# Patient Record
Sex: MALE | Race: BLACK OR AFRICAN AMERICAN | NOT HISPANIC OR LATINO | URBAN - METROPOLITAN AREA
[De-identification: names, ages, dates, MRNs, and addresses within clinical notes are randomized per-mention and may not be internally consistent; named-entity substitution may affect disease eponyms.]

---

## 2022-11-11 ENCOUNTER — EMERGENCY (EMERGENCY)
Facility: HOSPITAL | Age: 47
LOS: 1 days | Discharge: HOME | End: 2022-11-11
Attending: EMERGENCY MEDICINE

## 2022-11-11 VITALS
RESPIRATION RATE: 16 BRPM | HEART RATE: 75 BPM | SYSTOLIC BLOOD PRESSURE: 149 MMHG | OXYGEN SATURATION: 98 % | WEIGHT: 250 LBS | HEIGHT: 65 IN | TEMPERATURE: 98 F | DIASTOLIC BLOOD PRESSURE: 91 MMHG

## 2022-11-11 DIAGNOSIS — T20.49XA: ICD-10-CM
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
## 2022-11-11 DIAGNOSIS — T20.47XA: ICD-10-CM
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
## 2022-11-11 PROCEDURE — 99284 EMERGENCY DEPT VISIT MOD MDM: CPT

## 2022-11-11 RX ORDER — IBUPROFEN 200 MG
600 TABLET ORAL ONCE
Refills: 0 | Status: COMPLETED | OUTPATIENT
Start: 2022-11-11 | End: 2022-11-11

## 2022-11-11 RX ORDER — OXYCODONE AND ACETAMINOPHEN 5; 325 MG/1; MG/1
1 TABLET ORAL ONCE
Refills: 0 | Status: DISCONTINUED | OUTPATIENT
Start: 2022-11-11 | End: 2022-11-11

## 2022-11-11 RX ORDER — IBUPROFEN 200 MG
1 TABLET ORAL
Qty: 15 | Refills: 0
Start: 2022-11-11 | End: 2022-11-15

## 2022-11-11 RX ORDER — CEPHALEXIN 500 MG
500 CAPSULE ORAL ONCE
Refills: 0 | Status: COMPLETED | OUTPATIENT
Start: 2022-11-11 | End: 2022-11-11

## 2022-11-11 RX ORDER — TRAMADOL HYDROCHLORIDE 50 MG/1
1 TABLET ORAL
Qty: 15 | Refills: 0
Start: 2022-11-11 | End: 2022-11-15

## 2022-11-11 RX ORDER — CEPHALEXIN 500 MG
1 CAPSULE ORAL
Qty: 28 | Refills: 0
Start: 2022-11-11 | End: 2022-11-17

## 2022-11-11 RX ADMIN — Medication 600 MILLIGRAM(S): at 12:16

## 2022-11-11 RX ADMIN — Medication 500 MILLIGRAM(S): at 12:16

## 2022-11-11 RX ADMIN — OXYCODONE AND ACETAMINOPHEN 1 TABLET(S): 5; 325 TABLET ORAL at 12:16

## 2022-11-11 NOTE — ED PROVIDER NOTE - PHYSICAL EXAMINATION
Gen: Alert, NAD  Head: NC, AT   Eyes: PERRL, EOMI, normal lids/conjunctiva  ENT: normal hearing, patent oropharynx without erythema/exudate, uvula midline  Neck: supple, no tenderness, Trachea midline  Pulm: Bilateral BS, normal resp effort, no wheeze/stridor/retractions  CV: RRR, no M/R/G, 2+ radial and dp pulses bl, no edema  Abd: soft, NT/ND, +BS, no hepatosplenomegaly  Mskel: extremities x4 with normal ROM and no joint effusions. no ctl spine ttp.   Skin: appearance of denuded skin on back of neck and under chin with peeling and fresh dermis exposed. both are around 3x3cm in area. no purulence, warmth or fluctuance  Neuro: AAOx3, no sensory/motor deficits, CN 2-12 intact
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
## 2022-11-11 NOTE — ED PROVIDER NOTE - OBJECTIVE STATEMENT
47m no med hx pw wounds to head and neck. after pt gets hair cut he applies len silver topical gel. he has been using it for 1 week on back of neck and under chin, where skin was shaved. it has since developed a painful peeling wounds for the past 3 days. no fever, chills, nausea, vomiting. but it is rather painful. ros neg for ha, vision loss, rhinorrhea, cp, sob, abd pn, numbness, bleeding, dysuria, back pain, weakness

## 2022-11-11 NOTE — ED PROVIDER NOTE - PATIENT PORTAL LINK FT
You can access the FollowMyHealth Patient Portal offered by [facility] by registering at the following website: [email or web address]. By joining [facility]’s FollowMyHealth portal, you will also be able to view your health information using other applications (apps) compatible with our system.

## 2022-11-11 NOTE — ED PROVIDER NOTE - NSFOLLOWUPCLINICS_GEN_ALL_ED_FT
[facility]  Wound Care  [address] [place] [address]  Phone: [phone]  Fax: [phone]  Follow Up Time: Urgent
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
## 2022-11-11 NOTE — ED ADULT TRIAGE NOTE - CHIEF COMPLAINT QUOTE
as per patient c/o wound on posterior left neck, first noted 1 week ago. Pt states started as small raised area that opened on its own. Small raised area noted on the under chin as well.

## 2024-09-12 NOTE — ED PROVIDER NOTE - IV ALTEPLASE ADMIN OUTSIDE HIDDEN
Nephrology clinic follow up visit note    Chief complaint: CKD stage IIIb    HPI: 60-year-old male with past medical history of type 2 diabetes mellitus, hypertension, dyslipidemia, HFrEF with LVEF 55%, CKD stage IIIb with baseline creatinine 1.6-1.8, EGFR 43 mils per minute per 1.73 m².  He now came to follow up in the CKD clinic.    Interval history: Doing well. No new complaints. He denies any NSAID use.  He denies any nausea, vomiting, chest pain, fevers, shortness of breath, lower extremity edema, dysuria or hematuria.    Review of systems:   Constitutional:  Negative except as documented in history of present illness.    Eye:  Negative except as documented in history of present illness.    Ear/Nose/Mouth/Throat:  Negative except as documented in history of present illness.    Cardiovascular:  Negative except as documented in history of present illness.    Respiratory:  Negative except as documented in history of present illness.    Gastrointestinal:  Negative except as documented in history of present illness.    Genitourinary:  Negative except as documented in history of present illness.    Musculoskeletal:  Negative except as documented in history of present illness.    Integumentary:  Negative except as documented in history of present illness.    Hematology/Lymphatics:  Negative except as documented in history of present illness.    Neurologic:  Negative except as documented in history of present illness.    Endocrine:  Negative except as documented in history of present illness.    Allergy/Immunologic:  Negative except as documented in history of present illness.    Psychiatric:  Negative except as documented in history of present illness.    All other systems ROS reviewed as documented in chart .     Past medical history: Type 2 diabetes mellitus, hypertension, dyslipidemia, HFrEF with LVEF 55%, CKD stage IIIb, hyperth.  Past surgical history: Noncontributory  Family history: Mother had an Alzheimer's  dementia, father had CAD and  at 84.  Social history: Former smoker, no history of alcohol or drug abuse.  Allergic history: No known drug allergies    Current medications:  Metoprolol XL 50 mg once daily  Metformin 500mg po BID  Mounjaro once a week as directed  Farxiga 10mg po daily  Entresto 49-51 mg po BID  Methimazole 20 mg once daily for hyperthyroidism.  Insulin as directed  Fenofibrate 135 mg once daily  Rosuvastatin 40 mg once daily      Physical exam:   /86mmHg, Pulse 102/min, RR 16/min, Temp. 97.9degree F  In no acute distress  HEENT: No icterus, no pallor, no scars lesions masses noted  Neck is supple, no JVD  Lungs: CTA B, no wheezes, rhonchi, crackles heard  CVS: S1-S2 positive  Abdomen: Soft, nontender, bowel sounds positive  Extremities: No edema, cyanosis or clubbing noted  Integumentary: No LE edema  Neurologically: No gross neurologic deficit  Psychiatry: Appropriate mood and effect.     Labs reviewed    Renal US 3/23/2023:  FINDINGS: Urinary bladder is unremarkable with both ureteral jets   demonstrated.   Kidneys are normal in size and echogenicity with the right measuring 13.0   cm and the left measuring 13.4 cm in length.  No focal renal lesion,   calculi, or hydronephrosis.     Small visualized portion of the liver appears to be diffusely increased in   echogenicity.   Impression:    Unremarkable kidneys and urinary bladder.     Visualized liver appears to be fatty.       Assessment and plan: 59-year-old male with past medical history of type 2 diabetes mellitus, hypertension, dyslipidemia, CKD stage IIIb, HFrEF.    #CKD stage IIIb:  Baseline creatinine appears to between 1.6-1.8  S. Cr. 1.66<1.57<<1.62<1.66, eGFR 47ml/min/1.73m2  CKD likely in presence of longstanding hypertension and diabetes.  Recent urine albumin creatinine ratio 1.5g/g.  Last Urine albumin creatinine ratio 1g/g, on entresto as mentioned below.  All other work up including chronic hepatitis panel, HIV, RPR, DIMITRI  with reflex panel, anti-GBM antibody, SPEP/UPEP with reflex ALEXANDER and renal US unremarkable.  Recommended to avoid NSAIDs, nephrotoxins, IV contrast.  Encouraged to keep himself well hydrated.  Will recheck BMP, U/A  this visit.      #HFrEF with LVEF 55%:  #Hypertension:  Blood pressure acceptable range.  Currently appears euvolemic.  Following heart failure clinic with Dr. Florence.  Currently on Entresto 49-51 mg once daily, metoprolol XL 50 mg once daily.  Will monitor trend and suggest changes accordingly.    #Type 2 diabetes mellitus:  Last HbA1c 8.5, urine albumin creatinine ratio 1.5g/g   Glycemic control per primary care physician.  Already on farxiga and Entresto(valsartan based) to control proteinuria and slow progression of renal disease.    #Anemia of CKD:  Hemoglobin 14.0<14.4  Continue to monitor.    #Disorder of mineral metabolism of CKD:  Calcium 9.0   25-hydroxy vitamin D level >30ng/dl , PTH 35 and phosphorus level 4.6.  He is  taking vitamin D 2000 units over the counter once a day.    # Hyperthyroidism:  s/p radioactive iodine treatment  On methimazole.  Patient follows with his endocrin[facility] at [facility].    Primary CARE physician: ISELA Escalera    Thank you for the referral and allowing me to take part in the care if this patient. Will follow along with you.  Please call with questions if any on .  MIPS: A) MIPS  : Most recent systolic blood pressure <140 mmHmmHg  : Most recent diastolic blood pressure < 90 mmHmmHg  : I ATTEST AND DOCUMENTED CURRENT MEDICATIONS IN THE MEDICAL RECORDS, UPDATED, OR REVIEWED THE PATIENT’S CURRENT MEDICATIONS.  1036F: Current tobacco non-user non smoker  Medications reviewed  BMI normal.    [facility]  [facility]   Professional Building   [address], [address]  [place] [address]  Contact: [phone], Fax: [phone]  Perfect Serve: Kole Wheatley.       The   Century Cures Act makes medical notes like these available to patients in the interest of transparency. Please be advised that this is a medical document. Medical documents are intended to carry relevant information and the clinical opinion of the practitioner. The medical note is intended as medical provider to provider communication, and may appear blunt or direct. It is written in medical language, and may contain abbreviations or verbiage that are unfamiliar.    show

## 2024-09-19 ENCOUNTER — EMERGENCY (EMERGENCY)
Facility: HOSPITAL | Age: 49
LOS: 0 days | Discharge: HOME | End: 2024-09-19
Attending: STUDENT IN AN ORGANIZED HEALTH CARE EDUCATION/TRAINING PROGRAM
Payer: COMMERCIAL

## 2024-09-19 VITALS
DIASTOLIC BLOOD PRESSURE: 88 MMHG | HEIGHT: 65 IN | WEIGHT: 250 LBS | HEART RATE: 67 BPM | SYSTOLIC BLOOD PRESSURE: 149 MMHG | TEMPERATURE: 98 F | OXYGEN SATURATION: 97 % | RESPIRATION RATE: 18 BRPM

## 2024-09-19 VITALS
SYSTOLIC BLOOD PRESSURE: 127 MMHG | OXYGEN SATURATION: 98 % | RESPIRATION RATE: 20 BRPM | TEMPERATURE: 98 F | DIASTOLIC BLOOD PRESSURE: 76 MMHG | HEART RATE: 51 BPM

## 2024-09-19 DIAGNOSIS — R51.9 HEADACHE, UNSPECIFIED: ICD-10-CM

## 2024-09-19 PROCEDURE — 70450 CT HEAD/BRAIN W/O DYE: CPT | Mod: 26,MC

## 2024-09-19 PROCEDURE — 99284 EMERGENCY DEPT VISIT MOD MDM: CPT

## 2024-09-19 RX ORDER — METOCLOPRAMIDE HCL 5 MG
10 TABLET ORAL ONCE
Refills: 0 | Status: COMPLETED | OUTPATIENT
Start: 2024-09-19 | End: 2024-09-19

## 2024-09-19 RX ORDER — SODIUM CHLORIDE 9 MG/ML
1000 INJECTION INTRAMUSCULAR; INTRAVENOUS; SUBCUTANEOUS ONCE
Refills: 0 | Status: COMPLETED | OUTPATIENT
Start: 2024-09-19 | End: 2024-09-19

## 2024-09-19 RX ORDER — ACETAMINOPHEN 325 MG/1
975 TABLET ORAL ONCE
Refills: 0 | Status: COMPLETED | OUTPATIENT
Start: 2024-09-19 | End: 2024-09-19

## 2024-09-19 RX ADMIN — ACETAMINOPHEN 975 MILLIGRAM(S): 325 TABLET ORAL at 13:30

## 2024-09-19 RX ADMIN — Medication 10 MILLIGRAM(S): at 12:45

## 2024-09-19 RX ADMIN — SODIUM CHLORIDE 2000 MILLILITER(S): 9 INJECTION INTRAMUSCULAR; INTRAVENOUS; SUBCUTANEOUS at 12:46

## 2024-09-19 RX ADMIN — ACETAMINOPHEN 975 MILLIGRAM(S): 325 TABLET ORAL at 12:46

## 2024-09-19 NOTE — ED ADULT TRIAGE NOTE - CHIEF COMPLAINT QUOTE
c/o headache intermittently over past 3-4 days denies dizziness denies n/v denies blurry vision using tylenol prn with slight relief last took at 0400 seen at urgent care on sunday for same c/o states covid -, referred to er

## 2024-09-19 NOTE — ED PROVIDER NOTE - CARE PROVIDER_API CALL
Jess Rivers  Neurology  [address] [place] [address]  Phone: [phone]  Fax: [phone]  Follow Up Time: 1-3 Days

## 2024-09-19 NOTE — ED PROVIDER NOTE - PATIENT PORTAL LINK FT
You can access the FollowMyHealth Patient Portal offered by [facility] by registering at the following website: [email or web address]. By joining [facility]’s FollowMyHealth portal, you will also be able to view your health information using other applications (apps) compatible with our system.

## 2024-09-19 NOTE — ED PROVIDER NOTE - CLINICAL SUMMARY MEDICAL DECISION MAKING FREE TEXT BOX
48-year-old male with no reported significant past medical history presenting to the ED with complaints of headache for the past 4 days, reports has been taking Tylenol with some alleviation pain, works at night, wears corrective lenses, reports poor sleep hygiene secondary to job, patient denies any reported trauma, denies any reported numbness or weakness.  No other reported complaints.  Was seen in urgent care several days ago given medications and improved.  Reports headache reoccurred.    Headache is likely tension headache secondary to poor sleep hygiene secondary to his job, given recurrence of headache will obtain CT head imaging pain medications for headache patient is neurologically intact, will follow-up imaging and reassess likely discharge with PCP follow-up. 48-year-old male with no reported significant past medical history presenting to the ED with complaints of headache for the past 4 days, reports has been taking Tylenol with some alleviation pain, works at night, wears corrective lenses, reports poor sleep hygiene secondary to job, patient denies any reported trauma, denies any reported numbness or weakness.  No other reported complaints.  Was seen in urgent care several days ago given medications and improved.  Reports headache reoccurred.    Headache is likely tension headache secondary to poor sleep hygiene secondary to his job, given recurrence of headache will obtain CT head imaging pain medications for headache patient is neurologically intact, will follow-up imaging and reassess likely discharge with PCP follow-up.    DEBBIE Lawton: 1359 47 y/o male with no PMH here with headache x 4 days. No neurological deficits. Vs stable.   Ct imaging no acute findings. Pt reassessed and reports feeling better after medications. Possibly tension headache. PT stable to be discharged home and follow up with outpatient neuro.

## 2024-09-19 NOTE — ED ADULT NURSE NOTE - OBJECTIVE STATEMENT
48 yr old male AOx4. C/o right sided h/a x1 week. 5/10 pain. Pt denies CP, SOB, N/V/D, fever/chills, dizziness. No neuro deficits.

## 2024-09-19 NOTE — ED PROVIDER NOTE - NSFOLLOWUPINSTRUCTIONS_ED_ALL_ED_FT
Rest, drink plenty of fluids.  Advance activity as tolerated.  Continue all previously prescribed medications as directed.  Follow up with your primary care physician and neurologist in 48-72 hours- bring copies of your results.  Return to the ER for worsening or persistent symptoms, and/or ANY NEW OR CONCERNING SYMPTOMS. If you have issues obtaining follow up, please call: [phone] to obtain a doctor or specialist who takes your insurance in your area.  You may call [phone] to make an appointment with the internal medicine clinic.

## 2024-09-19 NOTE — ED PROVIDER NOTE - OBJECTIVE STATEMENT
48-year-old male with no reported significant past medical history presenting to the ED with complaints of headache for the past 4 days, reports has been taking Tylenol with some alleviation pain, works at night, wears corrective lenses, reports poor sleep hygiene secondary to job, patient denies any reported trauma, denies any reported numbness or weakness.  No other reported complaints.  Was seen in urgent care several days ago given medications and improved.  Reports headache reoccurred.

## 2024-09-19 NOTE — ED ADULT NURSE NOTE - NSFALLUNIVINTERV_ED_ALL_ED
Bed/Stretcher in lowest position, wheels locked, appropriate side rails in place/Call bell, personal items and telephone in reach/Instruct patient to call for assistance before getting out of bed/chair/stretcher/Non-slip footwear applied when patient is off stretcher/[place] to call system/Physically safe environment - no spills, clutter or unnecessary equipment/Purposeful proactive rounding/Room/bathroom lighting operational, light cord in reach